# Patient Record
Sex: FEMALE | Race: WHITE | NOT HISPANIC OR LATINO
[De-identification: names, ages, dates, MRNs, and addresses within clinical notes are randomized per-mention and may not be internally consistent; named-entity substitution may affect disease eponyms.]

---

## 2017-01-24 ENCOUNTER — RECORDS - HEALTHEAST (OUTPATIENT)
Dept: ADMINISTRATIVE | Facility: OTHER | Age: 52
End: 2017-01-24

## 2017-01-24 ENCOUNTER — OFFICE VISIT - HEALTHEAST (OUTPATIENT)
Dept: ONCOLOGY | Facility: HOSPITAL | Age: 52
End: 2017-01-24

## 2017-01-24 DIAGNOSIS — Z80.0 FAMILY HISTORY OF COLON CANCER: ICD-10-CM

## 2017-01-24 DIAGNOSIS — Z80.41 FAMILY HISTORY OF OVARIAN CANCER: ICD-10-CM

## 2017-01-25 ENCOUNTER — COMMUNICATION - HEALTHEAST (OUTPATIENT)
Dept: ONCOLOGY | Facility: HOSPITAL | Age: 52
End: 2017-01-25

## 2017-02-08 ENCOUNTER — COMMUNICATION - HEALTHEAST (OUTPATIENT)
Dept: ONCOLOGY | Facility: HOSPITAL | Age: 52
End: 2017-02-08

## 2017-02-16 ENCOUNTER — COMMUNICATION - HEALTHEAST (OUTPATIENT)
Dept: ONCOLOGY | Facility: HOSPITAL | Age: 52
End: 2017-02-16

## 2017-02-27 ENCOUNTER — AMBULATORY - HEALTHEAST (OUTPATIENT)
Dept: ONCOLOGY | Facility: HOSPITAL | Age: 52
End: 2017-02-27

## 2017-02-27 ENCOUNTER — COMMUNICATION - HEALTHEAST (OUTPATIENT)
Dept: ONCOLOGY | Facility: HOSPITAL | Age: 52
End: 2017-02-27

## 2018-01-15 ENCOUNTER — RECORDS - HEALTHEAST (OUTPATIENT)
Dept: LAB | Facility: CLINIC | Age: 53
End: 2018-01-15

## 2018-01-15 LAB
CHOLEST SERPL-MCNC: 212 MG/DL
FASTING STATUS PATIENT QL REPORTED: YES
FASTING STATUS PATIENT QL REPORTED: YES
GLUCOSE BLD-MCNC: 89 MG/DL (ref 70–125)
HDLC SERPL-MCNC: 62 MG/DL
LDLC SERPL CALC-MCNC: 135 MG/DL
TRIGL SERPL-MCNC: 73 MG/DL
VIT B12 SERPL-MCNC: 369 PG/ML (ref 213–816)

## 2018-01-16 LAB — 25(OH)D3 SERPL-MCNC: 33.5 NG/ML (ref 30–80)

## 2019-05-06 ENCOUNTER — RECORDS - HEALTHEAST (OUTPATIENT)
Dept: LAB | Facility: CLINIC | Age: 54
End: 2019-05-06

## 2019-05-08 LAB — 25(OH)D3 SERPL-MCNC: 25 NG/ML (ref 30–80)

## 2021-05-25 ENCOUNTER — RECORDS - HEALTHEAST (OUTPATIENT)
Dept: ADMINISTRATIVE | Facility: CLINIC | Age: 56
End: 2021-05-25

## 2021-06-08 NOTE — PROGRESS NOTES
[facility] GENETIC COUNSELING: CONSULT SUMMARY  Patient: Cecille Souza (1965 / 51 y.o., female) MRN: [id number]   [address] [facility] [address]      Date/Location of Visit: 1/24/2017, [facility]  Care Provider: Gerri Kraft MS, [facility] ([phone], [email or web address])  Reason for Visit: family history of colon cancer  Referring Provider: Jeovanny Patterson MD  Present: Cecille (Nataliia)    PRESENTING CONCERN: Cecille presents today for evaluation of her family history of colon cancer from a genetic standpoint.  This has been a source of concern and worry for Cecille and increasingly so after a friend of her 's found out she had Trinh syndrome and this friend's son was subsequently diagnosed with colon cancer at age 29.  Cecille has done considerable reading about Trinh syndrome and requests testing for this specifically today.    MEDICAL:   Cecille reports a history of squamous and basal cell skin cancers.  She has had colonoscopy screening since her mid 30s because of the family history and tells me this is done at 5 year intervals and she has never had polyps removed.  We have records from her colonoscopy on 7/2/2014 with [facility] and this notes diverticulosis in the sigmoid colon but otherwise normal exam.  She tells me she most recently had a colonoscopy on 1/3/2016 due to rectal bleeding and GI symptoms but this was also normal.  Cecille has never required upper endoscopy.  Her uterus and ovaries remain in place.  She reports first menstrual period at age 10 and menopause at age 45 with no hormone replacement.  No history of smoking.  No prior transplant or recent transfusion.    FAMILY: Three-generation pedigree was taken to assess cancer risk using information provided by Cecille and is scanned into her chart.  The importance of accurate and verified history was emphasized.  Cecille has a 19-year-old son and a 16-year-old  "daughter, both healthy.  Cecille's mother  of cancer at age 57 following exploratory surgery and Cecille tells me she believes this was a colon cancer.  The structure of the extended maternal family history is somewhat complex.  Cecille's maternal grandmother  of noncancer-related causes in her 80s and she had a sister (Cecille's great-aunt) who  in her 30s or 40s from reported ovarian cancer.  This same great-aunt actually also had children with Cecille's maternal grandfather; both of their sons  of colon cancer, one in his 40s and one in his mid 60s.  The latter individual had 5 of his own sons, 3 of whom had colon cancer and have now passed away.  This includes one in his mid 40s, another in his late 50s, and one whose colon cancer was at age 55 but who  at age 65 from what is reported as a separate brain cancer primary.  Cecille tells me that a daughter to the latter individual recently had genetic testing for Trinh syndrome that was negative.  All of the immediate relatives to this branch of the family except one have had colonoscopy screening and Cecille tells me that they have all had \"a few polyps\" removed.  Cecille's older sister apparently also had a few polyps removed at her initial colonoscopy.  Cecille's father had prostate cancer at age 72.  There are no other known cancers in the maternal or paternal lineages.  To her knowledge, Cecille denies any overt polyposis, learning differences/autism, dermatologic abnormalities, or macrocephaly in the family.  Cecille is of [place] and English descent on her maternal side and [place]ian on her paternal side with no Sabianism ancestry or consanguinity.    HISTORY ASSESSMENT:  In evaluating the family history we discussed differences between sporadic, environmental, and genetic contributions to cancer risk including relevant genetic principals and inheritance patterns.  I emphasized that the factors underlying cancer diagnosis are " often multiple and complex.  The pattern of colon cancer as reported in Cecille's family is highly concerning for an underlying hereditary etiology with Trinh syndrome being first on the differential given lack of polyposis and since this would also encompass risk for ovarian and brain cancers.    We spent some time discussing the interesting structure of Cecille's family and how this affects Cecille's risk.  The most striking portion of the family history is the 2 successive generations of multiple men who developed colon cancer on the maternal side.  These relatives are actually related to Cecille through both her maternal grandfather and her great-aunt (her grandmother's sister) which means that any genetic risk related to the cancers in these individuals could have been inherited by Cecille's mother through either Cecille's grandmother or grandfather.    I do note, however, that both of Cecille's maternal grandparents lived into their 80s without a cancer diagnosis.  I also pointed out that it is possible the cause of cancer in Cecille's mother could have been separate from that in her more extended relatives.  Further confirmation of her mother's diagnosis with either a death certificate or pathology records could be of value in this case.    GENETIC TESTING:  Cecille's family meets [place] II criteria and should be evaluated for Trinh syndrome.  At this point all affected family members are now  and so genetic testing is appropriately offered to their close relatives including Cecille.  We briefly discussed the possibility of testing tumor from a  relative but Cecille's mother was diagnosed some time in the 1970s and so feasibility of obtaining the tissue is probably limited and this would also involve out of pocket cost.  Cecille's personal risk of testing positive for a Trinh syndrome mutation is difficult to assess with available models such as [id number] given the complex  structure of her family.      We reviewed likelihood of possible results of genetic testing: positive, negative, and a variant of uncertain significance along with medical and social/psychological considerations. We reviewed cancer risks associated with Trinh syndrome.  This includes significantly higher risks for colon and endometrial cancers that also vary by the underlying gene but in any case would warrant colonoscopy surveillance ideally every year and for women, preventative hysterectomy often including bilateral salpingo-oophorectomy.  We also reviewed the variety of other cancers that occur more commonly in individuals with Trinh syndrome as this is a significant source of concern for Cecille.  Additional screening may include upper endoscopy, physical exam, urinalysis, dermatology screening and preliminary studies suggest risk could also be lowered with regular aspirin use.  I emphasized that the risk for each of these cancers individually remains quite small in those with Trinh syndrome and reviewed the data available in this regard which Cecille found somewhat reassuring.      If Cecille's genetic testing for Trinh syndrome is negative she understands that this will not eliminate her risk for colon cancer as the family history will remain significant in this regard.  We discussed other genes which have been implicated in risk for colon or ovarian cancer.  Cecille was offered the option of a more broad, multi-gene panel approach to testing but had several concerns about identifying additional, unexpected risks and so opted instead for a customized test that would exclude other high-risk syndromes (FAP, Peutz-Jeghers, Juvenile Polyposis, Li Fraumeni, Cowden, Diffuse Gastric Cancer) which are generally associated with characteristic polyps, tumors, or features that are not apparent in Cecille's family and therefore suspicion for these being the underlying culprit of the cancers is low.   Likewise, CARLOS  syndrome would not generally explain multi-generational colon cancer.       In comparison, mutations in GREM1, POLE, and POLD1 are more newly described causes of colon polyps and cancer in which extra-colonic features are not well described and/or polyposis is more moderate.  A mutation in these genes would also warrant closer colonoscopy surveillance for Cecille (at least every 2-3 years).  Cecille also expressed interest in other genes associated with ovarian cancer risk and that may warrant preventative surgery currently including BRCA1/2, BRIP1, RAD51C, and RAD51D.   We reviewed risk for breast cancer associated with BRCA1/2 mutations as well.  Although the only ovarian cancer in Cecille's family is in a third-degree relative, Cecille's mother  early and the maternal side is otherwise predominantly male and more difficult to assess for female-specific risks.  Ovarian status for Cecille's maternal grandmother is not known.    Limitations of a negative genetic test result were again emphasized including possibility of risk being linked to one or more other genes or mutations not detected by current methodologies.  Emphasized to Cecille the importance of continued close colonoscopy surveillance, at least every 5 years, even if genetic testing is negative.    RISK TO OTHERS:  Reviewed autosomal dominant inheritance.  If a mutation is identified targeted testing will be available to relatives and the importance of Cecille sharing this with them was emphasized.  Testing is generally limited to adults (18 and older) although exceptions apply to genes associated with childhood cancer risks or rare childhood conditions.    PSYCHOSOCIAL/DECISION-MAKING:  We spent time today exploring the emotional impact of genetic testing for Cecille.  She does have anxiety about living with cancer risks for which intervention is not possible and/or being overwhelmed by the variety of risks that she could face.  We discussed  the importance of psychological health for coping with genetic test results and I emphasized the importance of appropriate follow-up with her medical providers as well as the availability of supportive counseling if needed for Cecille.  Cecille asserts her desire to proceed today and tells me she has thought about this and feels ready.   She also acknowledges she does not feel any better about not knowing.  She identifies her  as a support for her and he is aware that she is undergoing testing.  I offered the option of in-person disclosure of results but Cecille declined.    Cecille's genetic testing was customized according to her goals, preferences, and concerns and she was ultimately pleased with the testing plan we made together.  This excludes high-risk syndromes that should be unlikely given her family history (Cecille was strongly in favor of eliminating these from analysis) but includes those genes that could impact colon and ovarian management but that are otherwise not as easily recognizable and not at this time linked with as many additional cancer risks.      PLAN: Cecille opted to pursue genetic testing for Trinh syndrome (MLH1, MSH2, MSH6, PMS2, EPCAM) and if this is negative, proceed with testing for: POLE, POLD1, GREM1, BRCA1, BRCA2, BRIP1, RAD51C, RAD51D.  Verbal and written informed consent obtained.  Blood draw arranged immediately following consultation.  Testing and benefit verification to be performed by: [facility].  Initial results expected rosita bout 2-3 weeks with disclosure planned by phone, Cecille requests that this be after 3:45pm if at all possible.  Additional results would then take another 4 weeks.   Cecille is encouraged to contact me in the event of delays.    Cecille should contact our clinic at least annually for updates or with changes to personal/family history as the clinic does not routinely re-contact individual patients with an increase or  change in knowledge.  It was a pleasure to meet Cecille today and to participate in her care.  I am of course happy to address any follow-up questions/concerns should these arise for Cecille or her providers.          NAA MARROQUIN MS, [facility] [place] [facility]  Certified Genetic Counselor  [facility]    Handouts or Enclosures:  contact information    Face-to-face time: 105 minutes    cc:   Cecille Patterson MD ([facility])   [facility]

## 2021-06-09 NOTE — PROGRESS NOTES
[facility] GENETIC COUNSELING: RESULT SUMMARY  Patient: Cecille Souza (1965 / 51 y.o., female)   MRN: [id number]   [address] [facility] [address]    Results Disclosure On: 2017 and 2017    Method of Disclosure: telephone   Care Provider: Gerri Kraft MS, [facility] ([phone], [email or web address])    TEST PERFORMED: Trinh syndrome 5-gene panel  TEST RESULT:  NEGATIVE (no mutations or uncertain variants identified)  LABORATORY: [facility]  GENES/CONDITIONS ANALYZED:     Trinh syndrome (MLH1, MSH2, MSH6, PMS2, and EPCAM); includes promoter sequencing of MLH1 and MSH2; includes exons 1-7 inversion in MSH2; only 3' end deletions are reported for EPCAM    TEST PERFORMED: custom colon/ovarian cancer 8-gene panel  TEST RESULT:  NEGATIVE (no mutations or uncertain variants identified)  LABORATORY: [facility]  GENES/CONDITIONS ANALYZED:     Hereditary Breast and Ovarian Cancer syndrome (BRCA1 and BRCA2); includes [id number] Montenegrin  mutation in BRCA2     defective double-stranded DNA break repair (BRIP1, RAD51C, and RAD51D)    Polymerase Proofreading-Associated Polyposis (POLD1 and POLE); missense variants outside the exonuclease domain are not reported    Hereditary Mixed Polyposis syndrome (GREM1); only 40kb 5'UTR gross duplication is analyzed    TECHNICAL NOTES: Testing includes sequencing and deletion/duplication analysis using Next-generation sequencing data and additional methods as necessary, unless otherwise noted.  Variants expected to be benign (i.e., harmless) are not reported.  See supplement to results for more detailed description of laboratory methodology.      PERTINENT HISTORY: Cecille has a history of non-melanoma skin cancers.  Cecille's mother  of cancer at age 57 following exploratory surgery and Cecille believes this was a colon cancer. The structure of the extended maternal family history is somewhat complex. Cecille's maternal grandmother   of noncancer-related causes in her 80s and she had a sister (Cecille's great-aunt) who  in her 30s or 40s from reported ovarian cancer. This same great-aunt actually also had children with Cecille's maternal grandfather; both of their sons  of colon cancer, one in his 40s and one in his mid 60s. The latter individual had 5 of his own sons, 3 of whom had colon cancer and have now passed away. This includes one in his mid 40s, another in his late 50s, and one whose colon cancer was at age 55 but who  at age 65 from what is reported as a separate brain cancer primary.  Cecille's father had prostate cancer at age 72.  See pedigree.    LIMITATIONS:  The possibility of a mutation in one of the 13 analyzed genes is significantly reduced for Cecille although small chance of a mutation missed by current methodology remains.  In addition, Cecille could still have cancer risk linked to one or more other genes not included in this panel.  There are some individuals/families who have Trinh syndrome as determined by tumor analyses but the underlying gene mutation remains undetectable.  Of course, interpretation of any findings is based on current scientific knowledge/standards and may evolve over time.    ADDITIONAL TESTING:  No additional genetic testing is recommended for Cecille at this time.  She has previously declined broader testing for other high-risk syndromes and there is no known overt polyposis in the family.  It is emphasized that both knowledge and available testing/technology are likely to improve with time.     Given Cecille's negative results she is not expected to pass on a currently detectable mutation in these 13 genes to her children.  Testing would be indicated for them only in the event of compelling personal or paternal family history.      The pattern of multiple colon cancers in two successive generations on Cecille's maternal side remains suggestive of an underlying hereditary  etiology and may or may not be linked to one of the genes for which Cecille was tested.  All affected family members are now , but testing remains available to their close relatives.  An immediate relative could attempt to obtain and test tumor tissue from a decedent in the family which could be very valuable in assessing risk and determining appropriate care for living family members.      CONTINUED CARE:  Until the cause of cancer in Cecille's family is better understood she should continue with heightened colonoscopy surveillance.  This should be at least every 5 years based on guidelines from the ACG/NCCN and considering the reported colon cancer in Cecille's mother prior to age 60.  However, no guideline encompasses Cecille's family history in it's entirety and we have previously discussed that the complex structure of the family makes it difficult to provide accurate risk assessment to her in this context.  In particular, the number and pattern of colon cancers reported is concerning for significantly elevated risk in the family and yet Cecille is related to the affected branch of the family through her maternal grandparents, both of whom lived into their 80s and never had cancer.  I certainly recommend that Cecille inform us if more information is obtained through testing of other relatives on her maternal side or if there are new cancer diagnoses or polyposis.  I have also encouraged Cecille to verify her mother's diagnosis as much as possible with a death certificate or pathology records since this is the only affected relative who is immediate to her.   Cecille will discuss these unique aspects of the history further with her gastroenterologist to make an individualized screening plan.    Continuation of general population screening for other cancers per ACS/NCCN guidelines is also appropriate at a minimum.  Important lifestyle considerations include regular exercise, maintaining a healthy  diet and weight, limiting alcohol consumption, avoidance/cessation of smoking, use of safety precautions in the sun, and ensuring adequate vitamin D.    Relatives are encouraged to discuss the family history, genetic testing, and cancer surveillance with their individual care providers.       PLAN: No further testing or follow-up in our clinic is planned for Cecille at this time.  Cecille should contact our clinic at least annually for updates or with changes to personal/family history as the clinic does not routinely re-contact individual patients with an increase or change in knowledge.    Enclosed:  Copy of results     Pedigree       NAA MARROQUIN MS, [facility]  Certified Genetic Counselor  [facility]    cc:   Cecille Patterson MD ([facility])   [facility]